# Patient Record
Sex: FEMALE | URBAN - METROPOLITAN AREA
[De-identification: names, ages, dates, MRNs, and addresses within clinical notes are randomized per-mention and may not be internally consistent; named-entity substitution may affect disease eponyms.]

---

## 2021-01-26 ENCOUNTER — OFFICE VISIT (OUTPATIENT)
Dept: URBAN - METROPOLITAN AREA CLINIC 27 | Facility: CLINIC | Age: 66
End: 2021-01-26
Payer: COMMERCIAL

## 2021-01-26 DIAGNOSIS — H25.13 AGE-RELATED NUCLEAR CATARACT, BILATERAL: ICD-10-CM

## 2021-01-26 PROCEDURE — 92134 CPTRZ OPH DX IMG PST SGM RTA: CPT | Performed by: OPHTHALMOLOGY

## 2021-01-26 PROCEDURE — 99204 OFFICE O/P NEW MOD 45 MIN: CPT | Performed by: OPHTHALMOLOGY

## 2021-01-26 ASSESSMENT — INTRAOCULAR PRESSURE
OD: 17
OS: 15

## 2021-01-26 NOTE — IMPRESSION/PLAN
Impression: Vitreous degeneration, bilateral: H43.813.  Bilateral. Plan: --Proceed with PPV OS as above
--RDW

## 2021-01-26 NOTE — IMPRESSION/PLAN
Impression: Puckering of macula, bilateral: H35.373. Bilateral.
 microns OD
 microns OS Plan: --exam/OCT confirm a visually-significant ERM OS>OD
--findings/diagnosis d/w pt in detail  
--given current symptoms and difficulty with ADLs, surgical repair was offered --r/b/a of PPV/MP d/w pt, inc bleeding, pain, infection, loss of vision, loss of eye
--pt understands that pre-ERM visual function is unlikely to fully return --pt elects to proceed with surgery Sx plan: 25g PPV/MP OS

## 2021-01-26 NOTE — IMPRESSION/PLAN
Impression: Age-related nuclear cataract, bilateral: H25.13. Bilateral. Plan: --NVS OU
--pt understands risk of cataract progression after PPV

## 2021-02-10 ENCOUNTER — SURGERY (OUTPATIENT)
Dept: URBAN - METROPOLITAN AREA EXTERNAL CLINIC 26 | Facility: EXTERNAL CLINIC | Age: 66
End: 2021-02-10
Payer: COMMERCIAL

## 2021-02-10 PROCEDURE — 67042 VIT FOR MACULAR HOLE: CPT | Performed by: OPHTHALMOLOGY

## 2021-02-11 ENCOUNTER — POST-OPERATIVE VISIT (OUTPATIENT)
Dept: URBAN - METROPOLITAN AREA CLINIC 27 | Facility: CLINIC | Age: 66
End: 2021-02-11
Payer: COMMERCIAL

## 2021-02-11 PROCEDURE — 99024 POSTOP FOLLOW-UP VISIT: CPT | Performed by: OPHTHALMOLOGY

## 2021-02-11 ASSESSMENT — INTRAOCULAR PRESSURE
OS: 8
OD: 10

## 2021-02-11 NOTE — IMPRESSION/PLAN
Impression: S/P PPV/MP OS - 1 Day. Puckering of macula, bilateral  H35.373.  Plan: --retina attached
--no s/s infection
--IOP acceptable
--start PF/Oflox QID
--RD/endoph WS discussed
--f/u 1 week

## 2021-02-23 ENCOUNTER — POST-OPERATIVE VISIT (OUTPATIENT)
Dept: URBAN - METROPOLITAN AREA CLINIC 27 | Facility: CLINIC | Age: 66
End: 2021-02-23
Payer: COMMERCIAL

## 2021-02-23 DIAGNOSIS — H35.373 PUCKERING OF MACULA, BILATERAL: Primary | ICD-10-CM

## 2021-02-23 PROCEDURE — 99024 POSTOP FOLLOW-UP VISIT: CPT | Performed by: OPHTHALMOLOGY

## 2021-02-23 ASSESSMENT — INTRAOCULAR PRESSURE
OD: 10
OS: 11

## 2021-02-23 NOTE — IMPRESSION/PLAN
Impression: S/P PPV/MP OS - 13 Days. Puckering of macula, bilateral  H35.373.  Plan: --retina attached
--no s/s infection
--IOP acceptable
--d/c oflox, taper PF as directed
--RD/endoph WS discussed
--f/u 1 month with OCT

## 2021-04-06 ENCOUNTER — POST-OPERATIVE VISIT (OUTPATIENT)
Dept: URBAN - METROPOLITAN AREA CLINIC 27 | Facility: CLINIC | Age: 66
End: 2021-04-06
Payer: COMMERCIAL

## 2021-04-06 PROCEDURE — 92134 CPTRZ OPH DX IMG PST SGM RTA: CPT | Performed by: OPHTHALMOLOGY

## 2021-04-06 PROCEDURE — 99024 POSTOP FOLLOW-UP VISIT: CPT | Performed by: OPHTHALMOLOGY

## 2021-04-06 ASSESSMENT — INTRAOCULAR PRESSURE
OD: 13
OS: 11

## 2021-04-06 NOTE — IMPRESSION/PLAN
Impression: S/P PPV, MP x ERM OS - 55 Days. Puckering of macula, bilateral  H35.373.  Plan: --retina attached
--no s/s infection
--IOP acceptable
--OCT shows resolved ERM

RTC 3 months DFE/OCT OU

## 2021-07-22 ENCOUNTER — OFFICE VISIT (OUTPATIENT)
Dept: URBAN - METROPOLITAN AREA CLINIC 27 | Facility: CLINIC | Age: 66
End: 2021-07-22
Payer: COMMERCIAL

## 2021-07-22 DIAGNOSIS — H43.813 VITREOUS DEGENERATION, BILATERAL: ICD-10-CM

## 2021-07-22 PROCEDURE — 92134 CPTRZ OPH DX IMG PST SGM RTA: CPT | Performed by: OPHTHALMOLOGY

## 2021-07-22 PROCEDURE — 99213 OFFICE O/P EST LOW 20 MIN: CPT | Performed by: OPHTHALMOLOGY

## 2021-07-22 ASSESSMENT — INTRAOCULAR PRESSURE
OS: 11
OD: 13

## 2021-07-22 NOTE — IMPRESSION/PLAN
Impression: Age-related nuclear cataract, bilateral: H25.13. Bilateral. Plan: --advancing post-PPV cataract OS
--return to Dr. Ana M Santos for CE/IOL [facility]

## 2021-07-22 NOTE — IMPRESSION/PLAN
Impression: Puckering of macula, bilateral  H35.373. S/P PPV, MP x ERM OS 02/10/2021 Plan: --exam/OCT show resolved ERM s/p PPV/MP OS
--stable ERM OD, not visually significant
--recommend observation OD, CE/IOL OS
--monitor OD annually RTC 12 months DFE/OCT OU

## 2021-07-22 NOTE — IMPRESSION/PLAN
Impression: Vitreous degeneration, bilateral: H43.813.  Bilateral. Plan: --S/P PPV OS as above
--RDW

## 2022-07-26 ENCOUNTER — OFFICE VISIT (OUTPATIENT)
Dept: URBAN - METROPOLITAN AREA CLINIC 27 | Facility: CLINIC | Age: 67
End: 2022-07-26
Payer: COMMERCIAL

## 2022-07-26 DIAGNOSIS — H25.11 AGE-RELATED NUCLEAR CATARACT, RIGHT EYE: ICD-10-CM

## 2022-07-26 DIAGNOSIS — Z96.1 PRESENCE OF INTRAOCULAR LENS: ICD-10-CM

## 2022-07-26 DIAGNOSIS — H35.373 PUCKERING OF MACULA, BILATERAL: Primary | ICD-10-CM

## 2022-07-26 DIAGNOSIS — H43.813 VITREOUS DEGENERATION, BILATERAL: ICD-10-CM

## 2022-07-26 PROCEDURE — 92134 CPTRZ OPH DX IMG PST SGM RTA: CPT | Performed by: OPHTHALMOLOGY

## 2022-07-26 PROCEDURE — 92014 COMPRE OPH EXAM EST PT 1/>: CPT | Performed by: OPHTHALMOLOGY

## 2022-07-26 ASSESSMENT — INTRAOCULAR PRESSURE
OD: 22
OS: 9

## 2022-07-26 NOTE — IMPRESSION/PLAN
Impression: Puckering of macula, bilateral  H35.373. S/P PPV, MP x ERM OS 02/10/2021 Plan: --exam/OCT demonstrate resolved ERM s/p PPV/MP OS
--stable ERM OD, no change since last year
--recommend observation OU
--monitor OD annually for progression RTC 12 months DFE/OCT OU